# Patient Record
Sex: FEMALE
[De-identification: names, ages, dates, MRNs, and addresses within clinical notes are randomized per-mention and may not be internally consistent; named-entity substitution may affect disease eponyms.]

---

## 2024-07-22 PROBLEM — Z00.00 ENCOUNTER FOR PREVENTIVE HEALTH EXAMINATION: Status: ACTIVE | Noted: 2024-07-22

## 2024-07-25 ENCOUNTER — APPOINTMENT (OUTPATIENT)
Dept: ORTHOPEDIC SURGERY | Facility: CLINIC | Age: 74
End: 2024-07-25
Payer: MEDICARE

## 2024-07-25 ENCOUNTER — RESULT CHARGE (OUTPATIENT)
Age: 74
End: 2024-07-25

## 2024-07-25 DIAGNOSIS — S83.231A COMPLEX TEAR OF MEDIAL MENISCUS, CURRENT INJURY, RIGHT KNEE, INITIAL ENCOUNTER: ICD-10-CM

## 2024-07-25 DIAGNOSIS — M94.20 CHONDROMALACIA, UNSPECIFIED SITE: ICD-10-CM

## 2024-07-25 PROCEDURE — 73560 X-RAY EXAM OF KNEE 1 OR 2: CPT | Mod: RT

## 2024-07-25 PROCEDURE — 99204 OFFICE O/P NEW MOD 45 MIN: CPT

## 2024-07-25 RX ORDER — MELOXICAM 15 MG/1
15 TABLET ORAL
Qty: 30 | Refills: 1 | Status: ACTIVE | COMMUNITY
Start: 2024-07-25 | End: 1900-01-01

## 2024-07-25 NOTE — HISTORY OF PRESENT ILLNESS
[de-identified] : Patient is here for evaluation of her right knee she has pain and difficulty walking points anterior, comes with her daughter today, had surgery 4 years ago in [place] for medial lateral meniscectomy chondroplas  She is a new onset diabetic has no allergies  Physical exam she is got small knee effusion diffuse pain nontender over the joint lines some patellofemoral crepitus ligaments are stable negative Homans' sign no erythema guarded range of motion  X-ray shows mild arthritis but no loose bodies no soft tissue calcifications right knee standing AP and lateral medial lateral joint lines for Kellgren-Nadeem grade 1  Diagnosis is rule out the meniscal tear as well as chondromalacia with an MRI, physical therapy prescription prescription given, a prescription for anti-inflammatories side effects discussed we will hold off on injections she does not know how to do fingersticks we will see her back in 4 to 6 weeks She is unable to do the cortisone shot would recommend gel injections of note her surgery note from 2020 arthroscopy was available and 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

## 2024-08-14 ENCOUNTER — NON-APPOINTMENT (OUTPATIENT)
Age: 74
End: 2024-08-14

## 2024-08-15 ENCOUNTER — APPOINTMENT (OUTPATIENT)
Dept: ORTHOPEDIC SURGERY | Facility: CLINIC | Age: 74
End: 2024-08-15

## 2024-08-19 ENCOUNTER — APPOINTMENT (OUTPATIENT)
Dept: ORTHOPEDIC SURGERY | Facility: CLINIC | Age: 74
End: 2024-08-19
Payer: MEDICARE

## 2024-08-19 DIAGNOSIS — S83.231D COMPLEX TEAR OF MEDIAL MENISCUS, CURRENT INJURY, RIGHT KNEE, SUBSEQUENT ENCOUNTER: ICD-10-CM

## 2024-08-19 DIAGNOSIS — M94.261 CHONDROMALACIA, RIGHT KNEE: ICD-10-CM

## 2024-08-19 PROCEDURE — 99213 OFFICE O/P EST LOW 20 MIN: CPT | Mod: 25

## 2024-08-19 PROCEDURE — 20611 DRAIN/INJ JOINT/BURSA W/US: CPT | Mod: RT

## 2024-08-19 NOTE — IMAGING
[de-identified] : Physical exam of the right knee: No effusion, no erythema or ecchymosis.  There are old portal sites noted on the anterior aspect of the knee from previous surgery.  Full extension, flexion to 95 degrees.  She has crepitus with range of motion.  Mild tenderness to palpation of the medial joint line.  No tenderness palpation over the lateral joint line.  No tenderness to palpation over the anterior aspect of the right knee she is guarded throughout the exam.  Equivocal Wolfgang's testing.  Intact to light touch, mild antalgic gait.

## 2024-08-19 NOTE — HISTORY OF PRESENT ILLNESS
[de-identified] : The patient is a 73-year-old female here for a subsequent reevaluation of her left knee.  MRI showed mostly chondromalacia and a small medial meniscus tear.  She states on the night of 8/14/2024 she got up very quickly from the couch and she experienced severe pain in the right knee.  She stated that night it was hard to lift her right foot off of the ground.  This pain has started to decrease in severity.

## 2024-08-19 NOTE — PROCEDURE
[Large Joint Injection] : Large joint injection [Right] : of the right [Knee] : knee [Pain] : pain [Alcohol] : alcohol [___ cc    1%] : Lidocaine ~Vcc of 1%  [___ cc    4mg] : Dexamethasone (Decadron) ~Vcc of 4 mg  [Risks, benefits, alternatives discussed / Verbal consent obtained] : the risks benefits, and alternatives have been discussed, and verbal consent was obtained [All ultrasound images have been permanently captured and stored accordingly in our picture archiving and communication system] : All ultrasound images have been permanently captured and stored accordingly in our picture archiving and communication system [Visualization of the needle and placement of injection was performed without complication] : visualization of the needle and placement of injection was performed without complication

## 2024-08-19 NOTE — DISCUSSION/SUMMARY
[de-identified] : Today I recommend a cortisone injection for the right knee, the patient agreed.  The right knee was injected with 2 cc lidocaine, 1 cc dexamethasone.  Procedure note generated.  She will start the formal physical therapy recommended at her previous office visit here, new Rx provided for that.  She has a follow-up on 9/9/2024 with Dr. Julien and will keep that appointment.

## 2024-09-09 ENCOUNTER — APPOINTMENT (OUTPATIENT)
Dept: ORTHOPEDIC SURGERY | Facility: CLINIC | Age: 74
End: 2024-09-09
Payer: MEDICARE

## 2024-09-09 DIAGNOSIS — S83.231D COMPLEX TEAR OF MEDIAL MENISCUS, CURRENT INJURY, RIGHT KNEE, SUBSEQUENT ENCOUNTER: ICD-10-CM

## 2024-09-09 PROCEDURE — 99214 OFFICE O/P EST MOD 30 MIN: CPT

## 2024-09-09 RX ORDER — MELOXICAM 15 MG/1
15 TABLET ORAL
Qty: 30 | Refills: 0 | Status: ACTIVE | COMMUNITY
Start: 2024-09-09 | End: 1900-01-01

## 2024-09-09 NOTE — HISTORY OF PRESENT ILLNESS
[de-identified] : Patient is here for evaluation of her right knee she had 2 therapy sessions she has had no medicines and therapy and the injection as well still having difficulty with the knee MRI with a medial meniscal tear as well as chondromalacia did have a second acute episode after MRI 1 day after with acute pain in her knee Patient comes in with her daughter today  Physical exam right knee is got a positive Mckeon's and patellofemoral crepitus guarded range of motion but stable small knee effusion negative Homans' sign  She is interested in more therapy so new prescription was generated continue on the anti-inflammatories side effects discussed we will see her back in 6 weeks if she fails to improve we will discuss arthroscopy, I did discuss arthroscopy with him in detail today but since she has had limited therapy we will give her a full course of therapy

## 2024-10-21 ENCOUNTER — APPOINTMENT (OUTPATIENT)
Dept: ORTHOPEDIC SURGERY | Facility: CLINIC | Age: 74
End: 2024-10-21

## 2024-12-13 ENCOUNTER — NEW PATIENT (OUTPATIENT)
Dept: URBAN - METROPOLITAN AREA CLINIC 87 | Facility: CLINIC | Age: 74
End: 2024-12-13

## 2024-12-13 DIAGNOSIS — H35.3220: ICD-10-CM

## 2024-12-13 DIAGNOSIS — H35.3112: ICD-10-CM

## 2024-12-13 DIAGNOSIS — H25.13: ICD-10-CM

## 2024-12-13 PROCEDURE — 92134 CPTRZ OPH DX IMG PST SGM RTA: CPT

## 2024-12-13 PROCEDURE — 99204 OFFICE O/P NEW MOD 45 MIN: CPT

## 2024-12-13 PROCEDURE — 92202 OPSCPY EXTND ON/MAC DRAW: CPT

## 2024-12-13 ASSESSMENT — VISUAL ACUITY
OD_CC: 20/40
OS_CC: 20/300-1

## 2024-12-13 ASSESSMENT — TONOMETRY
OS_IOP_MMHG: 14
OD_IOP_MMHG: 14

## 2024-12-20 ENCOUNTER — PROCEDURE ONLY (OUTPATIENT)
Dept: URBAN - METROPOLITAN AREA CLINIC 87 | Facility: CLINIC | Age: 74
End: 2024-12-20

## 2024-12-20 DIAGNOSIS — H35.3220: ICD-10-CM

## 2024-12-20 PROCEDURE — 67028 INJECTION EYE DRUG: CPT | Mod: 25,LT

## 2024-12-20 PROCEDURE — PFS EYLEA PFS: Mod: JZ

## 2024-12-20 ASSESSMENT — VISUAL ACUITY
OD_CC: 20/40+2
OS_CC: 20/150-1

## 2024-12-20 ASSESSMENT — TONOMETRY
OD_IOP_MMHG: 12
OS_IOP_MMHG: 15

## 2025-01-24 ENCOUNTER — FOLLOW UP (OUTPATIENT)
Dept: URBAN - METROPOLITAN AREA CLINIC 87 | Facility: CLINIC | Age: 75
End: 2025-01-24

## 2025-01-24 DIAGNOSIS — H35.3112: ICD-10-CM

## 2025-01-24 DIAGNOSIS — H35.3220: ICD-10-CM

## 2025-01-24 DIAGNOSIS — H25.13: ICD-10-CM

## 2025-01-24 PROCEDURE — PFS EYLEA PFS: Mod: JZ

## 2025-01-24 PROCEDURE — 67028 INJECTION EYE DRUG: CPT

## 2025-01-24 PROCEDURE — 92202 OPSCPY EXTND ON/MAC DRAW: CPT | Mod: NC

## 2025-01-24 PROCEDURE — 92014 COMPRE OPH EXAM EST PT 1/>: CPT | Mod: 25

## 2025-01-24 PROCEDURE — 92134 CPTRZ OPH DX IMG PST SGM RTA: CPT

## 2025-01-24 ASSESSMENT — VISUAL ACUITY
OS_CC: 20/150
OD_CC: 20/40

## 2025-01-24 ASSESSMENT — TONOMETRY
OS_IOP_MMHG: 14
OD_IOP_MMHG: 16

## 2025-02-21 ENCOUNTER — FOLLOW UP (OUTPATIENT)
Age: 75
End: 2025-02-21

## 2025-02-21 DIAGNOSIS — H35.3112: ICD-10-CM

## 2025-02-21 DIAGNOSIS — H35.3220: ICD-10-CM

## 2025-02-21 PROCEDURE — PFS EYLEA PFS: Mod: JZ

## 2025-02-21 PROCEDURE — 92202 OPSCPY EXTND ON/MAC DRAW: CPT | Mod: 59

## 2025-02-21 PROCEDURE — 92134 CPTRZ OPH DX IMG PST SGM RTA: CPT

## 2025-02-21 PROCEDURE — 92014 COMPRE OPH EXAM EST PT 1/>: CPT | Mod: 25

## 2025-02-21 PROCEDURE — 67028 INJECTION EYE DRUG: CPT

## 2025-02-21 ASSESSMENT — VISUAL ACUITY
OD_CC: 20/30-2
OS_CC: 20/150

## 2025-02-21 ASSESSMENT — TONOMETRY
OD_IOP_MMHG: 15
OS_IOP_MMHG: 13

## 2025-03-28 ENCOUNTER — FOLLOW UP (OUTPATIENT)
Dept: URBAN - METROPOLITAN AREA CLINIC 87 | Age: 75
End: 2025-03-28

## 2025-03-28 DIAGNOSIS — H25.13: ICD-10-CM

## 2025-03-28 DIAGNOSIS — H35.3112: ICD-10-CM

## 2025-03-28 DIAGNOSIS — H35.3220: ICD-10-CM

## 2025-03-28 PROCEDURE — 92202 OPSCPY EXTND ON/MAC DRAW: CPT | Mod: NC

## 2025-03-28 PROCEDURE — PFS EYLEA PFS: Mod: JZ

## 2025-03-28 PROCEDURE — 92134 CPTRZ OPH DX IMG PST SGM RTA: CPT

## 2025-03-28 PROCEDURE — 92014 COMPRE OPH EXAM EST PT 1/>: CPT | Mod: 25

## 2025-03-28 PROCEDURE — 67028 INJECTION EYE DRUG: CPT

## 2025-03-28 ASSESSMENT — VISUAL ACUITY
OD_CC: 20/30-2
OS_CC: 20/150-1

## 2025-03-28 ASSESSMENT — TONOMETRY
OS_IOP_MMHG: 12
OD_IOP_MMHG: 16

## 2025-04-29 ENCOUNTER — OUTPATIENT (OUTPATIENT)
Dept: OUTPATIENT SERVICES | Facility: HOSPITAL | Age: 75
LOS: 1 days | Discharge: HOME | End: 2025-04-29
Payer: MEDICARE

## 2025-04-29 VITALS
SYSTOLIC BLOOD PRESSURE: 137 MMHG | HEART RATE: 82 BPM | TEMPERATURE: 98 F | RESPIRATION RATE: 17 BRPM | DIASTOLIC BLOOD PRESSURE: 62 MMHG | HEIGHT: 63 IN | WEIGHT: 141.98 LBS | OXYGEN SATURATION: 98 %

## 2025-04-29 VITALS
SYSTOLIC BLOOD PRESSURE: 134 MMHG | OXYGEN SATURATION: 99 % | HEART RATE: 77 BPM | RESPIRATION RATE: 18 BRPM | DIASTOLIC BLOOD PRESSURE: 68 MMHG

## 2025-04-29 DIAGNOSIS — Z98.890 OTHER SPECIFIED POSTPROCEDURAL STATES: Chronic | ICD-10-CM

## 2025-04-29 LAB — GLUCOSE BLDC GLUCOMTR-MCNC: 127 MG/DL — HIGH (ref 70–99)

## 2025-04-29 PROCEDURE — 82962 GLUCOSE BLOOD TEST: CPT

## 2025-04-29 PROCEDURE — V2632: CPT

## 2025-04-29 RX ORDER — METFORMIN HYDROCHLORIDE 850 MG/1
1 TABLET ORAL
Refills: 0 | DISCHARGE

## 2025-04-29 RX ORDER — TRAZODONE HCL 100 MG
0 TABLET ORAL
Refills: 0 | DISCHARGE

## 2025-04-29 RX ORDER — ROSUVASTATIN CALCIUM 20 MG/1
1 TABLET, FILM COATED ORAL
Refills: 0 | DISCHARGE

## 2025-04-29 RX ORDER — LEVOTHYROXINE SODIUM 300 MCG
1 TABLET ORAL
Refills: 0 | DISCHARGE

## 2025-04-29 NOTE — ASU PATIENT PROFILE, ADULT - ABILITY TO HEAR (WITH HEARING AID OR HEARING APPLIANCE IF NORMALLY USED):
hearing aid in left ear/Mildly to Moderately Impaired: difficulty hearing in some environments or speaker may need to increase volume or speak distinctly

## 2025-04-29 NOTE — ASU PATIENT PROFILE, ADULT - NSICDXPASTMEDICALHX_GEN_ALL_CORE_FT
PAST MEDICAL HISTORY:  History of medical problems diabetes, current smoker, high cholesterol, hypothyroid,macular degeneration left eye

## 2025-04-29 NOTE — ASU PATIENT PROFILE, ADULT - FALL HARM RISK - HARM RISK INTERVENTIONS

## 2025-05-02 DIAGNOSIS — H25.12 AGE-RELATED NUCLEAR CATARACT, LEFT EYE: ICD-10-CM

## 2025-05-02 DIAGNOSIS — Z79.84 LONG TERM (CURRENT) USE OF ORAL HYPOGLYCEMIC DRUGS: ICD-10-CM

## 2025-05-02 DIAGNOSIS — E11.36 TYPE 2 DIABETES MELLITUS WITH DIABETIC CATARACT: ICD-10-CM

## 2025-05-16 ENCOUNTER — FOLLOW UP (OUTPATIENT)
Dept: URBAN - METROPOLITAN AREA CLINIC 87 | Age: 75
End: 2025-05-16

## 2025-05-16 DIAGNOSIS — H25.11: ICD-10-CM

## 2025-05-16 DIAGNOSIS — H35.3220: ICD-10-CM

## 2025-05-16 DIAGNOSIS — H35.3112: ICD-10-CM

## 2025-05-16 DIAGNOSIS — Z96.1: ICD-10-CM

## 2025-05-16 PROCEDURE — 92014 COMPRE OPH EXAM EST PT 1/>: CPT | Mod: 25

## 2025-05-16 PROCEDURE — 92134 CPTRZ OPH DX IMG PST SGM RTA: CPT

## 2025-05-16 PROCEDURE — 67028 INJECTION EYE DRUG: CPT

## 2025-05-16 PROCEDURE — PFS EYLEA PFS: Mod: JZ

## 2025-05-16 PROCEDURE — 92202 OPSCPY EXTND ON/MAC DRAW: CPT | Mod: NC

## 2025-05-16 ASSESSMENT — TONOMETRY
OS_IOP_MMHG: 10
OD_IOP_MMHG: 13

## 2025-05-16 ASSESSMENT — VISUAL ACUITY
OD_CC: 20/40+2
OS_SC: 20/200-1

## (undated) RX ORDER — KETOROLAC TROMETHAMINE 5 MG/ML: 1 SOLUTION OPHTHALMIC

## (undated) RX ORDER — PREDNISOLONE ACETATE 10 MG/ML: 1 SUSPENSION/ DROPS OPHTHALMIC